# Patient Record
Sex: FEMALE | Race: WHITE | URBAN - METROPOLITAN AREA
[De-identification: names, ages, dates, MRNs, and addresses within clinical notes are randomized per-mention and may not be internally consistent; named-entity substitution may affect disease eponyms.]

---

## 2018-04-10 PROBLEM — Z96.643 HISTORY OF BILATERAL HIP REPLACEMENTS: Status: ACTIVE | Noted: 2018-04-10

## 2018-04-10 PROBLEM — F90.9 ATTENTION DEFICIT HYPERACTIVITY DISORDER (ADHD): Status: ACTIVE | Noted: 2018-04-10

## 2018-04-10 PROBLEM — J45.40 MODERATE PERSISTENT ASTHMA WITHOUT COMPLICATION: Status: ACTIVE | Noted: 2018-04-10

## 2018-04-10 PROBLEM — M47.816 LUMBAR SPONDYLOSIS: Status: ACTIVE | Noted: 2018-04-10

## 2018-04-10 PROBLEM — F90.0 ATTENTION DEFICIT HYPERACTIVITY DISORDER (ADHD), PREDOMINANTLY INATTENTIVE TYPE: Status: RESOLVED | Noted: 2018-04-10 | Resolved: 2018-04-10

## 2018-04-10 PROBLEM — F90.0 ATTENTION DEFICIT HYPERACTIVITY DISORDER (ADHD), PREDOMINANTLY INATTENTIVE TYPE: Status: ACTIVE | Noted: 2018-04-10

## 2021-03-26 ENCOUNTER — IMMUNIZATION (OUTPATIENT)
Dept: PRIMARY CARE CLINIC | Age: 61
End: 2021-03-26
Payer: COMMERCIAL

## 2021-03-26 PROCEDURE — 91301 COVID-19, MODERNA VACCINE 100MCG/0.5ML DOSE: CPT | Performed by: PHYSICIAN ASSISTANT

## 2021-03-26 PROCEDURE — 0011A PR IMM ADMN SARSCOV2 100 MCG/0.5 ML 1ST DOSE: CPT | Performed by: PHYSICIAN ASSISTANT

## 2021-04-23 ENCOUNTER — IMMUNIZATION (OUTPATIENT)
Dept: PRIMARY CARE CLINIC | Age: 61
End: 2021-04-23
Payer: COMMERCIAL

## 2021-04-23 PROCEDURE — 0012A COVID-19, MODERNA VACCINE 100MCG/0.5ML DOSE: CPT | Performed by: INTERNAL MEDICINE

## 2021-04-23 PROCEDURE — 91301 COVID-19, MODERNA VACCINE 100MCG/0.5ML DOSE: CPT | Performed by: INTERNAL MEDICINE

## 2022-12-23 ENCOUNTER — APPOINTMENT (OUTPATIENT)
Dept: GENERAL RADIOLOGY | Age: 62
End: 2022-12-23
Payer: COMMERCIAL

## 2022-12-23 ENCOUNTER — HOSPITAL ENCOUNTER (EMERGENCY)
Age: 62
Discharge: HOME | End: 2022-12-23
Payer: COMMERCIAL

## 2022-12-23 VITALS
SYSTOLIC BLOOD PRESSURE: 156 MMHG | RESPIRATION RATE: 18 BRPM | DIASTOLIC BLOOD PRESSURE: 100 MMHG | HEART RATE: 81 BPM | TEMPERATURE: 97.3 F | OXYGEN SATURATION: 99 %

## 2022-12-23 DIAGNOSIS — S61.216A LACERATION OF RIGHT LITTLE FINGER WITHOUT FOREIGN BODY WITHOUT DAMAGE TO NAIL, INITIAL ENCOUNTER: Primary | ICD-10-CM

## 2022-12-23 DIAGNOSIS — S60.051A CONTUSION OF RIGHT LITTLE FINGER WITHOUT DAMAGE TO NAIL, INITIAL ENCOUNTER: ICD-10-CM

## 2022-12-23 DIAGNOSIS — Z23 NEED FOR TETANUS BOOSTER: ICD-10-CM

## 2022-12-23 PROCEDURE — 6370000000 HC RX 637 (ALT 250 FOR IP): Performed by: NURSE PRACTITIONER

## 2022-12-23 PROCEDURE — 2500000003 HC RX 250 WO HCPCS: Performed by: NURSE PRACTITIONER

## 2022-12-23 PROCEDURE — 12002 RPR S/N/AX/GEN/TRNK2.6-7.5CM: CPT

## 2022-12-23 PROCEDURE — 6360000002 HC RX W HCPCS: Performed by: NURSE PRACTITIONER

## 2022-12-23 PROCEDURE — 90471 IMMUNIZATION ADMIN: CPT | Performed by: NURSE PRACTITIONER

## 2022-12-23 PROCEDURE — 73140 X-RAY EXAM OF FINGER(S): CPT

## 2022-12-23 PROCEDURE — 99284 EMERGENCY DEPT VISIT MOD MDM: CPT

## 2022-12-23 PROCEDURE — 90715 TDAP VACCINE 7 YRS/> IM: CPT | Performed by: NURSE PRACTITIONER

## 2022-12-23 RX ORDER — TETANUS AND DIPHTHERIA TOXOIDS ADSORBED 2; 2 [LF]/.5ML; [LF]/.5ML
0.5 INJECTION INTRAMUSCULAR ONCE
Status: DISCONTINUED | OUTPATIENT
Start: 2022-12-23 | End: 2022-12-23

## 2022-12-23 RX ORDER — CEPHALEXIN 500 MG/1
500 CAPSULE ORAL 4 TIMES DAILY
Qty: 28 CAPSULE | Refills: 0 | Status: SHIPPED | OUTPATIENT
Start: 2022-12-23 | End: 2022-12-30

## 2022-12-23 RX ORDER — LIDOCAINE HYDROCHLORIDE 10 MG/ML
20 INJECTION, SOLUTION INFILTRATION; PERINEURAL ONCE
Status: COMPLETED | OUTPATIENT
Start: 2022-12-23 | End: 2022-12-23

## 2022-12-23 RX ORDER — NAPROXEN 500 MG/1
500 TABLET ORAL 2 TIMES DAILY PRN
Qty: 14 TABLET | Refills: 0 | Status: SHIPPED | OUTPATIENT
Start: 2022-12-23 | End: 2022-12-30

## 2022-12-23 RX ORDER — IBUPROFEN 800 MG/1
800 TABLET ORAL ONCE
Status: COMPLETED | OUTPATIENT
Start: 2022-12-23 | End: 2022-12-23

## 2022-12-23 RX ORDER — BACITRACIN ZINC 500 [USP'U]/G
OINTMENT TOPICAL ONCE
Status: COMPLETED | OUTPATIENT
Start: 2022-12-23 | End: 2022-12-23

## 2022-12-23 RX ADMIN — BACITRACIN ZINC: 500 OINTMENT TOPICAL at 18:24

## 2022-12-23 RX ADMIN — LIDOCAINE HYDROCHLORIDE 20 ML: 10 INJECTION, SOLUTION INFILTRATION; PERINEURAL at 18:23

## 2022-12-23 RX ADMIN — TETANUS TOXOID, REDUCED DIPHTHERIA TOXOID AND ACELLULAR PERTUSSIS VACCINE, ADSORBED 0.5 ML: 5; 2.5; 8; 8; 2.5 SUSPENSION INTRAMUSCULAR at 18:19

## 2022-12-23 RX ADMIN — IBUPROFEN 800 MG: 800 TABLET, FILM COATED ORAL at 17:15

## 2022-12-23 NOTE — ED PROVIDER NOTES
[facility]  Department of Emergency Medicine   ED  Encounter Note  Admit Date/RoomTime: 2022  5:10 PM  ED Room: [facility]  NAME: Juarez Osman  : 1960  MRN: [id number]    CHIEF COMPLAINT     Laceration (Patient got her right pinky finger caught in a door hinge that caused 2 lacerations on the finger. )    HISTORY OF PRESENT ILLNESS        Juarez Osman is a 63 y.o. female who presents to the ED by private vehicle for laceration to right 5th finger, beginning few minutes ago. She reports she caught her finger in a old wooden door hinge. She is right hand dominant. The complaint has been persistent and gradually worsening and are moderate in severity. She has subjective numbness to the finger. REVIEW OF SYSTEMS     Pertinent positives and negatives are stated within HPI, all other systems reviewed and are negative. Past Medical History:  has a past medical history of Asthma and Trauma. Surgical History:  has a past surgical history that includes Tubal ligation; pelvic laparoscopy;  section ();  section (); Tonsillectomy (); and Hysterectomy (10/2010). Social History:  reports that she has never smoked. She has never used smokeless tobacco. She reports current alcohol use. She reports that she does not use drugs. Family History: family history includes Heart Disease in her mother; Mental Illness in her father; Obesity in her mother.      Allergies: Doxycycline, Eggs or egg-derived products, and Sulfa antibiotics  CURRENT MEDICATIONS       Discharge Medication List as of 2022  6:53 PM        CONTINUE these medications which have NOT CHANGED    Details   conjugated estrogens (PREMARIN) 0.625 MG/GM vaginal cream Place 0.5g vaginal twice weekly, Disp-1 Tube, R-0, Normal      estradiol (ESTRACE VAGINAL) 0.1 MG/GM vaginal cream Place 0.5 g vaginally Twice a Week, Disp-1 Tube, R-3Normal minocycline (MINOCIN;DYNACIN) 50 MG capsule Take 1 capsule by mouth 2 times daily, Disp-180 capsule, R-3Normal      DiphenhydrAMINE HCl (BENADRYL ALLERGY PO) Take by mouthHistorical Med      doxazosin (CARDURA) 1 MG tablet TAKE 3 TABLETS BY MOUTH AT BEDTIME, R-0Historical Med      aspirin 81 MG tablet Take 81 mg by mouth dailyHistorical Med      Probiotic Product (SOLUBLE FIBER/PROBIOTICS PO) Take by mouthHistorical Med      Multiple Vitamins-Minerals (EMERGEN-C IMMUNE PO) Take by mouthHistorical Med      Amphetamine-Dextroamphetamine (ADDERALL PO) Take by mouth as needed Historical Med      cetirizine (ZYRTEC) 10 MG tablet Take 10 mg by mouth as needed Historical Med      montelukast (SINGULAIR) 10 MG tablet Take 10 mg by mouth nightly. lorazepam (ATIVAN) 0.5 MG tablet Take 0.5 mg by mouth every 6 hours as needed . Jill Brewer Historical Med             SCREENINGS     Juanito Coma Scale  Eye Opening: Spontaneous  Best Verbal Response: Oriented  Best Motor Response: Obeys commands  Juanito Coma Scale Score: 15         CIWA Assessment  BP: (!) 156/100  Heart Rate: 81       PHYSICAL EXAM   Oxygen Saturation Interpretation: Normal on room air analysis. ED Triage Vitals   BP Temp Temp src Heart Rate Resp SpO2 Height Weight   12/23/22 1707 12/23/22 1703 -- 12/23/22 1703 12/23/22 1707 12/23/22 1703 -- --   (!) 156/100 97.3 °F (36.3 °C)  81 18 99 %           Physical Exam  Constitutional/General: Alert and oriented x3, well appearing, non toxic  HEENT:  NC/NT. PERRLA,  Airway patent. Neck: Supple, full ROM, non tender to palpation in the midline, no stridor, no crepitus, no meningeal signs  Respiratory: Lungs clear to auscultation bilaterally, no wheezes, rales, or rhonchi. Not in respiratory distress  CV:  Regular rate. Regular rhythm. No murmurs, gallops, or rubs. 2+ distal pulses  Chest: No chest wall tenderness  GI:  Abdomen Soft, Non tender, Non distended. +BS. No rebound, guarding, or rigidity.  No pulsatile masses. Musculoskeletal: Moves all extremities x 4. Warm and well perfused, no clubbing, cyanosis, or edema. Capillary refill <3 seconds. Laceration to the right distal finger approximately 1.5 cm on the dorsum and 1.5 cm on the volar aspect  Integument: skin warm and dry. No rashes. Lymphatic: no lymphadenopathy noted  Neurologic: GCS 15, no focal deficits, symmetric strength 5/5 in the upper and lower extremities bilaterally  Psychiatric: Normal Affect    DIAGNOSTIC RESULTS   (All laboratory and radiology results have been personally reviewed by myself)  Labs:  No results found for this visit on 12/23/22. Imaging: All Radiology results interpreted by Radiologist unless otherwise noted. XR FINGER RIGHT (MIN 2 VIEWS)   Final Result   No active process. ED COURSE   Vitals:    Vitals:    12/23/22 1703 12/23/22 1707   BP:  (!) 156/100   Pulse: 81    Resp:  18   Temp: 97.3 °F (36.3 °C)    SpO2: 99%        Patient was given the following medications:  Medications   lidocaine 1 % injection 20 mL (20 mLs IntraDERmal Given by Other 12/23/22 1823)   bacitracin zinc ointment ( Topical Given by Other 12/23/22 1824)   ibuprofen (ADVIL;MOTRIN) tablet 800 mg (800 mg Oral Given 12/23/22 1715)   tetanus-diphth-acell pertussis (BOOSTRIX) injection 0.5 mL (0.5 mLs IntraMUSCular Given 12/23/22 1819)          PROCEDURES     PROCEDURE NOTE  12/23/22       Time: 1830    LACERATION REPAIR  Risks, benefits and alternatives (for applicable procedures below) described. Performed By: ADEEL Reyes CNP. Informed consent: Verbal consent obtained. The patient was counseled regarding the procedure in person, it's indications, risks, potential complications and alternatives and any questions were answered. Verbal consent was obtained. Laceration #: 1. Location: right distal finger dorsum  Length: 1.5 cm. The wound area was irrigated with sterile saline and draped in a sterile fashion.   Local Anesthesia: obtained with Lidocaine 1% without epinephrine. The wound was explored with the following results: Thickness: full thickness. no foreign body or tendon injury seen. Debridement: None. Undermining: None. Wound Margins Revised: None. Flaps Aligned: yes. The wound was closed in single layer closure with #3  4-0 Prolene using interrupted suture(s). Dressing:  bacitracin, a sterile dressing, and a bandage. PROCEDURE NOTE  12/23/22       Time: 1838    LACERATION REPAIR  Risks, benefits and alternatives (for applicable procedures below) described. Performed By: ADEEL Martin CNP. Informed consent: Verbal consent obtained. The patient was counseled regarding the procedure in person, it's indications, risks, potential complications and alternatives and any questions were answered. Verbal consent was obtained. Laceration #: 2. Location: right distal finger volar aspect  Length: 1.5 cm. The wound area was irrigated with sterile saline and draped in a sterile fashion. Local Anesthesia:  obtained with Lidocaine 1% without epinephrine. The wound was explored with the following results: Thickness: full thickness. No tendon laceration seen, no foreign body or tendon injury seen. Debridement: None. Undermining: None. Wound Margins Revised: no.  Flaps Aligned: yes. The wound was closed in single layer closure with #3  4-0 Prolene using interrupted suture(s). Dressing:  bacitracin and a sterile dressing. There were no additional wounds requiring formal closure. Patient tolerated procedure well.          CONSULTS:  None  DIFFERENTIAL DX_MDM   MDM:  This is a 53-year-old female patient who arrives today with a laceration to the right fifth finger on the dorsum and volar aspect and caught her finger in the hinge of a door several minutes prior to arrival and has subjective numbness and does have full sensation to touch when palpated she is able to flex and extend at the MCP PIP and DIP joint x-ray was performed and does have no acute fractures. Tetanus booster was updated today. She is right-hand dominant. Compartments soft and compressible in the hand with brisk capillary refill and sensation intact to medial, radial and ulnar nerves. Patient had laceration repaired to the dorsum and volar aspect after it was thoroughly irrigated and no tendon involvement. Patient was given Motrin and she additionally will be given Keflex since it was located over the actual distal joint prophylactically. She was advised on signs and symptoms of infection warranting immediate return such as fever, chills, redness, purulent drainage, pain out of proportion or streaking up the arm to return immediately for reevaluation. Patient advised to follow-up with her PCP in the next few days for reevaluation and have suture removal in 7 to 10 days. Patient also advised to keep her hand elevated above the level of the heart is much as possible. Plan of Care/Counseling:  ADEEL Dumas CNP reviewed today's visit with the patient and spouse / life partner in addition to providing specific details for the plan of care and counseling regarding the diagnosis and prognosis. Questions are answered at this time and are agreeable with the plan. ASSESSMENT     1. Laceration of right little finger without foreign body without damage to nail, initial encounter    2. Contusion of right little finger without damage to nail, initial encounter    3. Need for tetanus booster        DISPOSITION   Discharged home.   Patient condition is good    NEW MEDICATIONS     Discharge Medication List as of 12/23/2022  6:53 PM        START taking these medications    Details   naproxen (NAPROSYN) 500 MG tablet Take 1 tablet by mouth 2 times daily as needed for Pain (take with food and full glass of water.), Disp-14 tablet, R-0Normal      cephALEXin (KEFLEX) 500 MG capsule Take 1 capsule by mouth 4 times daily for 7 days, Disp-28 capsule, R-0Normal           Electronically signed by Cydney Kanner, APRN - CNP   DD: 12/23/22  **This report was transcribed using voice recognition software. Every effort was made to ensure accuracy; however, inadvertent computerized transcription errors may be present.   END OF ED PROVIDER NOTE      Cydney Kanner, APRN - CNP  12/23/22 2007

## 2022-12-31 ENCOUNTER — HOSPITAL ENCOUNTER (EMERGENCY)
Age: 62
Discharge: HOME | End: 2022-12-31
Payer: COMMERCIAL

## 2022-12-31 VITALS
BODY MASS INDEX: 25.85 KG/M2 | HEART RATE: 70 BPM | SYSTOLIC BLOOD PRESSURE: 124 MMHG | TEMPERATURE: 97.3 F | WEIGHT: 170 LBS | DIASTOLIC BLOOD PRESSURE: 72 MMHG | RESPIRATION RATE: 16 BRPM | OXYGEN SATURATION: 100 %

## 2022-12-31 DIAGNOSIS — Z48.02 VISIT FOR SUTURE REMOVAL: Primary | ICD-10-CM

## 2022-12-31 PROCEDURE — 99211 OFF/OP EST MAY X REQ PHY/QHP: CPT

## 2022-12-31 RX ORDER — ESTRADIOL 1 MG/1
1 TABLET ORAL DAILY
COMMUNITY

## 2022-12-31 NOTE — ED PROVIDER NOTES
[facility]  EMERGENCY DEPARTMENT ENCOUNTER        NAME: Klaudia White  :  1960  MRN:  [id number]  Date of evaluation: 2022  Provider: Jessica Murrieta PA-C  PCP: Raúl Dunaway DO  Note Started : 2:36 PM EST 22    Chief Complaint: Suture / Staple Removal (Right 5th finger suture removal)      This is a 59-year-old female the presents to urgent care for suture removal.  She has had no problems. She has 2 lacerations sites to the right fifth finger. There are 6 sutures in total. Sutures placed on 22. She is had no problems currently. On first contact patient she appears to be no acute distress. Review of Systems  Pertinent positives and negatives are stated within HPI, all other systems reviewed and are negative. Allergies: Doxycycline, Eggs or egg-derived products, and Sulfa antibiotics     --------------------------------------------- PAST HISTORY ---------------------------------------------  Past Medical History:  has a past medical history of Asthma and Trauma. Past Surgical History:  has a past surgical history that includes Tubal ligation; pelvic laparoscopy;  section ();  section (); Tonsillectomy (); and Hysterectomy (10/2010). Social History:  reports that she has never smoked. She has never used smokeless tobacco. She reports current alcohol use. She reports that she does not use drugs. Family History: family history includes Heart Disease in her mother; Mental Illness in her father; Obesity in her mother. The patients home medications have been reviewed. The nursing notes within the ED encounter have been reviewed.      ------------------------------------------------SCREENINGS----------------------------------------------                        LAURIEWA Assessment  BP: 124/72  Heart Rate: 70           ---------------------------------------------PHYSICAL EXAM --------------------------------------------    Vitals:    12/31/22 1311   BP: 124/72   Pulse: 70   Resp: 16   Temp: 97.3 °F (36.3 °C)   SpO2: 100%   Weight: 170 lb (77.1 kg)     Oxygen Saturation Interpretation: Normal     Physical Exam  Vitals and nursing note reviewed. Constitutional:       Appearance: She is well-developed. HENT:      Head: Normocephalic and atraumatic. Eyes:      Pupils: Pupils are equal, round, and reactive to light. Cardiovascular:      Rate and Rhythm: Normal rate and regular rhythm. Heart sounds: Normal heart sounds. No murmur heard. Pulmonary:      Effort: Pulmonary effort is normal. No respiratory distress. Breath sounds: Normal breath sounds. No wheezing or rales. Abdominal:      General: Bowel sounds are normal.      Palpations: Abdomen is soft. Tenderness: There is no abdominal tenderness. There is no guarding or rebound. Musculoskeletal:      Cervical back: Normal range of motion and neck supple. Skin:     General: Skin is warm and dry. Comments: Healing wounds to right fifth finger. No redness no drainage. Full range of motion no weakness. No cyanosis. Neurological:      General: No focal deficit present. Mental Status: She is alert and oriented to person, place, and time. Cranial Nerves: No cranial nerve deficit. Coordination: Coordination normal.       -------------------------------------------------- RESULTS -------------------------------------------------  No results found for this visit on 12/31/22. No orders to display       Labs Reviewed - No data to display    When ordered only abnormal lab results are displayed. All other labs were within normal range or not returned as of this dictation. Interpretation per the Radiologist below, if available at the time of this note:    No orders to display     No results found. No results found. -------------------------------------------------PROCEDURES--------------------------------------------  Unless otherwise noted below, none      Suture Removal    Date/Time: 12/31/2022 2:30 PM  Performed by: Yimi Engel PA-C  Authorized by: Yimi Engel PA-C     Consent:     Consent obtained:  Verbal    Consent given by:  Patient    Risks, benefits, and alternatives were discussed: yes      Risks discussed:  Bleeding    Alternatives discussed:  No treatment  Universal protocol:     Procedure explained and questions answered to patient or proxy's satisfaction: yes      Patient identity confirmed:  Verbally with patient  Location:     Location: right fifth finger. Procedure details:     Wound appearance:  No signs of infection, good wound healing and clean    Number of sutures removed:  6  Post-procedure details:     Post-removal:  Dressing applied    Procedure completion:  Tolerated well, no immediate complications      ---EMERGENCY DEPARTMENT COURSE and DIFFERENTIAL DIAGNOSIS/MDM---  (CC/HPI Summary, DDx, ED Course, and Reassessment:) (Disposition Considerations (include 1 Tests not done, Admit vs D/C, Shared Decision Making, Pt Expectation of Test or Tx., Consults, Social Determinants, Chronic Conditiions, Records reviewed)    MDM       DISCHARGE MEDICATIONS:  New Prescriptions    No medications on file       DISCONTINUED MEDICATIONS:  Discontinued Medications    AMPHETAMINE-DEXTROAMPHETAMINE (ADDERALL PO)    Take by mouth as needed     ASPIRIN 81 MG TABLET    Take 81 mg by mouth daily    CETIRIZINE (ZYRTEC) 10 MG TABLET    Take 10 mg by mouth as needed     DOXAZOSIN (CARDURA) 1 MG TABLET    TAKE 3 TABLETS BY MOUTH AT BEDTIME    MONTELUKAST (SINGULAIR) 10 MG TABLET    Take 10 mg by mouth nightly.        PATIENT REFERRED TO:  Greta Magdaleno, [address] [place] [address], [address] [place] [address],[address]  [facility] [address]  [phone]    Schedule an appointment as soon as possible for a visit --------------------------------- ADDITIONAL PROVIDER NOTES ---------------------------------  I have spoken with the patient and discussed todays results, in addition to providing specific details for the plan of care and counseling regarding the diagnosis and prognosis. Their questions are answered at this time and they are agreeable with the plan. This patient is stable for discharge. I have shared the specific conditions for return, as well as the importance of follow-up. * NOTE: (Please note that portions of this note were completed with a voice recognition program.  Efforts were made to edit the dictations but occasionally words are mis-transcribed. )    --------------------------------- IMPRESSION AND DISPOSITION ---------------------------------    IMPRESSION  1. Visit for suture removal        DISPOSITION Decision To Discharge 12/31/2022 02:35:15 PM    Disposition: Discharge to home  Patient condition is good    I am the Primary Clinician of Record.      Juan Carlos Ortiz PA-C (electronically signed) on 12/31/2022 at 2:36 PM        Juan Carlos Ortiz PA-C  12/31/22 3009

## 2023-02-12 ENCOUNTER — HOSPITAL ENCOUNTER (EMERGENCY)
Age: 63
Discharge: HOME | End: 2023-02-12
Attending: EMERGENCY MEDICINE
Payer: COMMERCIAL

## 2023-02-12 ENCOUNTER — APPOINTMENT (OUTPATIENT)
Dept: GENERAL RADIOLOGY | Age: 63
End: 2023-02-12
Payer: COMMERCIAL

## 2023-02-12 ENCOUNTER — APPOINTMENT (OUTPATIENT)
Dept: CT IMAGING | Age: 63
End: 2023-02-12
Payer: COMMERCIAL

## 2023-02-12 VITALS
HEART RATE: 60 BPM | SYSTOLIC BLOOD PRESSURE: 128 MMHG | DIASTOLIC BLOOD PRESSURE: 63 MMHG | BODY MASS INDEX: 25.85 KG/M2 | TEMPERATURE: 98.2 F | OXYGEN SATURATION: 98 % | WEIGHT: 170 LBS | RESPIRATION RATE: 16 BRPM
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
## 2023-02-12 DIAGNOSIS — S09.90XA INJURY OF HEAD, INITIAL ENCOUNTER: Primary | ICD-10-CM

## 2023-02-12 PROCEDURE — 99284 EMERGENCY DEPT VISIT MOD MDM: CPT

## 2023-02-12 PROCEDURE — 70486 CT MAXILLOFACIAL W/O DYE: CPT

## 2023-02-12 PROCEDURE — 73110 X-RAY EXAM OF WRIST: CPT

## 2023-02-12 PROCEDURE — 70450 CT HEAD/BRAIN W/O DYE: CPT

## 2023-02-12 PROCEDURE — 6370000000 HC RX 637 (ALT 250 FOR IP): Performed by: EMERGENCY MEDICINE

## 2023-02-12 PROCEDURE — 72100 X-RAY EXAM L-S SPINE 2/3 VWS: CPT

## 2023-02-12 PROCEDURE — 72125 CT NECK SPINE W/O DYE: CPT

## 2023-02-12 PROCEDURE — 73130 X-RAY EXAM OF HAND: CPT

## 2023-02-12 RX ORDER — DIPHENHYDRAMINE HCL 25 MG
25 TABLET ORAL ONCE
Status: COMPLETED | OUTPATIENT
Start: 2023-02-12 | End: 2023-02-12

## 2023-02-12 RX ORDER — OXYCODONE HYDROCHLORIDE AND ACETAMINOPHEN 5; 325 MG/1; MG/1
1 TABLET ORAL ONCE
Status: COMPLETED | OUTPATIENT
Start: 2023-02-12 | End: 2023-02-12

## 2023-02-12 RX ADMIN — DIPHENHYDRAMINE HCL 25 MG: 25 TABLET ORAL at 04:14

## 2023-02-12 RX ADMIN — OXYCODONE AND ACETAMINOPHEN 1 TABLET: 5; 325 TABLET ORAL at 05:42

## 2023-02-12 ASSESSMENT — PAIN DESCRIPTION - ORIENTATION: ORIENTATION: RIGHT

## 2023-02-12 ASSESSMENT — PAIN DESCRIPTION - DESCRIPTORS: DESCRIPTORS: ACHING

## 2023-02-12 ASSESSMENT — PAIN SCALES - GENERAL: PAINLEVEL_OUTOF10: 8

## 2023-02-12 ASSESSMENT — LIFESTYLE VARIABLES
HOW OFTEN DO YOU HAVE A DRINK CONTAINING ALCOHOL: NEVER
HOW MANY STANDARD DRINKS CONTAINING ALCOHOL DO YOU HAVE ON A TYPICAL DAY: PATIENT DOES NOT DRINK

## 2023-02-12 ASSESSMENT — PAIN DESCRIPTION - LOCATION: LOCATION: HEAD

## 2023-02-12 NOTE — ED PROVIDER NOTES
Douglas Palacios is a 63 y.o. female    HPI   Douglas Palacios is a 63 y.o. female presenting to the ED for Fall (Tripped and fell, right arm, shoulder, wrist, hip and head pain. Right eye swollen. Denies loc denies blood thinner )    History comes primarily from the patient. Patient presents for mechanical fall in which she landed on her right side. She did hit her head and face but denies loss of consciousness or blood thinner use. The patient is complaining of primarily right face pain, and right hand pain. She does have some chronic lumbar back pain. She also has some very minor soreness of her right knee, shoulder, hip, but these are minimal.  The pain in her hand is moderate to severe, the pain in her face is moderate to severe. The patient did have 2 cocktails at dinner last night, approximately 8 hours ago. She does occasionally use marijuana, but does not smoke otherwise. Review of Systems   Full review of systems completed. Pertinent positives and negatives per the HPI, unless otherwise stated ROS is negative. Physical Exam  Vitals and nursing note reviewed. Constitutional:       General: She is not in acute distress. Appearance: Normal appearance. She is not ill-appearing. HENT:      Head: Normocephalic. Comments: Significant bruising to the right side of the face. Right Ear: External ear normal.      Left Ear: External ear normal.      Nose: Nose normal. No rhinorrhea. Mouth/Throat:      Mouth: Mucous membranes are moist.      Pharynx: Oropharynx is clear. Eyes:      Extraocular Movements: Extraocular movements intact. Conjunctiva/sclera: Conjunctivae normal.      Pupils: Pupils are equal, round, and reactive to light. Comments: EOM intact, no conjunctival hemorrhage. Pupils are equal and reactive. Visual fields intact. Cardiovascular:      Rate and Rhythm: Normal rate and regular rhythm. Pulses: Normal pulses.       Heart sounds: Normal heart sounds. No murmur heard. Pulmonary:      Effort: Pulmonary effort is normal. No respiratory distress. Abdominal:      General: There is no distension. Palpations: Abdomen is soft. Musculoskeletal:         General: No swelling or deformity. Normal range of motion. Cervical back: Normal range of motion and neck supple. No rigidity or tenderness. Comments: Mild right-sided lumbar paraspinous muscular tenderness. No midline tenderness   Skin:     General: Skin is warm and dry. Coloration: Skin is not jaundiced or pale. Findings: Bruising present. Comments: Bruising to the right hand along with pain on palpation   Neurological:      General: No focal deficit present. Mental Status: She is alert and oriented to person, place, and time. Mental status is at baseline. Cranial Nerves: No cranial nerve deficit. Sensory: No sensory deficit. Motor: No weakness. Psychiatric:         Mood and Affect: Mood normal.         Behavior: Behavior normal.          Medical Decision Making/Differential Diagnosis:    CC/HPI Summary, social determinants of health, records reviewed, DDX, testing done/not done, ED course, reassessment, disposition considerations/shared decision making with patient, consults, disposition:    Medical Decision Making  Amount and/or Complexity of Data Reviewed  Radiology: ordered and independent interpretation performed. Decision-making details documented in ED Course. Risk  OTC drugs. Prescription drug management. Patient presented for fall with pain to her right hand and right head with significant bruising around her right face and a little bit of her right hand. These are the 2 primary complaints with other minor lesser complaints of pain on her right side of the body. X-rays of the right hand and wrist and lumbar spine were ordered. CT of the head, cervical spine and facial bones were ordered.   Right wrist and right hand showed no osseous abnormalities. X-ray of the lumbar spine appears to have no osseous abnormalities either. CT of the head, cervical spine and facial bones showed no osseous abnormalities, or intracranial hemorrhaging. Patient received a dose of Benadryl here in the emergency department because she says she breaks out in hives very easily in medical settings for unknown allergy. She is given a dose of Percocet prior to discharge for her pain but none are prescribed outpatient. The patient declined a prescription for ibuprofen or naproxen saying they make her sick. Patient will follow-up outpatient with her primary care physician. Past medical history/chonic conditions affecting care   has a past medical history of Asthma and Trauma. Social History     Tobacco Use    Smoking status: Never    Smokeless tobacco: Never   Substance Use Topics    Alcohol use: Yes     Comment: social    Drug use: No         RADIOLOGY:   Non-plain film images such as CT, Ultrasound and MRI are read by the radiologist. Danie Loosen radiographic images are visualized and preliminarily interpreted by myself with the below findings:  Patient's x-rays were independently interpreted by me including the x-ray of the right hand, wrist and the lumbar spine. There are no osseous abnormalities noted. As interpreted by me, the below displayed labs are abnormal. All other labs obtained during this visit were within normal range or not returned as of this dictation. Labs Reviewed - No data to display    Interpretation per the Radiologist below, if available at the time of this note:  XR HAND RIGHT (MIN 3 VIEWS)   Final Result   1. Intact hand and wrist without acute osseous fracture or dislocation. XR WRIST RIGHT (MIN 3 VIEWS)   Final Result   1. Intact hand and wrist without acute osseous fracture or dislocation. CT HEAD WO CONTRAST   Final Result   1. No acute intracranial abnormality is identified.    2. Right preseptal and periorbital soft tissue swelling with extension along   the right facial soft tissues and right frontal scalp. No underlying   calvarial fracture or facial bone fracture. 3. Intact facial bones. 4. Intact cervical spine without acute fracture. 5. Reversal the normal cervical lordosis which may be related to muscular   strain. CT CSpine W/O Contrast   Final Result   1. No acute intracranial abnormality is identified. 2. Right preseptal and periorbital soft tissue swelling with extension along   the right facial soft tissues and right frontal scalp. No underlying   calvarial fracture or facial bone fracture. 3. Intact facial bones. 4. Intact cervical spine without acute fracture. 5. Reversal the normal cervical lordosis which may be related to muscular   strain. CT FACIAL BONES WO CONTRAST   Final Result   1. No acute intracranial abnormality is identified. 2. Right preseptal and periorbital soft tissue swelling with extension along   the right facial soft tissues and right frontal scalp. No underlying   calvarial fracture or facial bone fracture. 3. Intact facial bones. 4. Intact cervical spine without acute fracture. 5. Reversal the normal cervical lordosis which may be related to muscular   strain. XR LUMBAR SPINE (2-3 VIEWS)    (Results Pending)     No results found. No results found.     Procedures       Critical care time      Emergency Department Course  Vitals:    Vitals:    02/12/23 0359 02/12/23 0408 02/12/23 0543   BP:  (!) 142/70 128/63   Pulse: 74 74 60   Resp:  18 16   Temp:  98.2 °F (36.8 °C)    SpO2:  98% 98%   Weight:  170 lb (77.1 kg)        Patient was given the following medications:  Medications   diphenhydrAMINE (BENADRYL) tablet 25 mg (25 mg Oral Given 2/12/23 0414)   oxyCODONE-acetaminophen (PERCOCET) 5-325 MG per tablet 1 tablet (1 tablet Oral Given 2/12/23 9176)               CONSULTS: (Who and What was discussed)  None        I am the Primary Clinician of Record. Final impression  1. Injury of head, initial encounter    2. Fall, initial encounter          Disposition/plan  DISPOSITION Decision To Discharge 02/12/2023 05:33:00 AM    PATIENT REFERRED TO:  Inna Snowden, [address] [facility], [address] [place] [address],[address]  [facility] [id number]    Call in 1 day      DISCHARGE MEDICATIONS:  Discharge Medication List as of 2/12/2023  5:48 AM          DISCONTINUED MEDICATIONS:  Discharge Medication List as of 2/12/2023  5:48 AM                 (Please note that portions of this note were completed with a voice recognition program.  Efforts were made to edit the dictations but occasionally words are mis-transcribed.)         Melyssa Siegel MD  Resident  02/12/23 1173  ATTENDING PROVIDER ATTESTATION:     I have personally performed and/or participated in the history, exam, medical decision making, and procedures and agree with all pertinent clinical information. I have also reviewed and agree with the past medical, family and social history unless otherwise noted. I have discussed this patient in detail with the resident, and provided the instruction and education regarding fall. My findings/Plan: I was the primary provider for patient. With history of asthma presenting here because of mechanical fall. She reports that she was walking in the street. Patient was out with her . Patient reports there was some kind of object that was on the street and she tripped and fell she did hit her head. Patient did not lose consciousness she does complain of head pain as well as pain around her eye patient also complaining of some right wrist pain as well as some chest wall pain. Patient reporting no difficulty breathing she reports no hip pain she reports some mild lower back pain. Patient reporting some mild numbness to her right hand.   Patient is awake alert oriented x3 patient does have noted edema and swelling to her right side of report as well as periorbital ecchymosis. Patient pupils are equal and reactive to gross visual acuity is within normal limits. Patient heart lung exam normal she has some very mild tenderness to her right lateral chest wall. There is no crepitus lung sounds are equal bilaterally. Patient does have noted contusion bruising to her hand and tenderness to her wrist.  Range of motion full but painful. Patient pulses are intact and abdomen soft nontender and there is some mild tenderness to lower lumbar spine. Patient has normal movement of her lower extremities. Pulses are intact distally. Patient differential includes subarachnoid hemorrhage as well as orbital fracture as well as facial fracture and wrist fracture. Patient last evaluation was in December for wound care. Patient does smoke occasionally socially drinks. Patient did undergo imaging of her head as well as cervical spine and facial bones there is no evidence of any facial fracture or intracranial hemorrhage or cervical spine fracture. Patient did undergo x-rays of her hand and wrist I did look at the x-rays myself I did not see any obvious fractures radiology confirmed no fracture as well. Patient did get a dose of Percocet here in the emergency department for her pain. Patient was offered Naprosyn or anti-inflammatory patient declined prescription. Patient lumbar spine x-rays did not show any signs of any fracture. Patient is able to ambulate without any difficulty. Patient vital signs are stable here in the emergency room. Patient will be discharged. Patient to follow-up with her PCP while reevaluate patient.   Patient reports at times she does have sensation of kaleidoscope sensation in her vision in the right eye I did offer to have her referred to ophthalmology as patient reports that she will follow-up with her eye doctor and I did tell her that she would need to see this person sooner rather than later her gross visual acuity here in the emergency department was normal for me. Patient understands this and will follow-up outpatient.        Tashi Garcia MD  02/12/23 7090       Tashi Garcia MD  02/12/23 Macario Khan MD  02/12/23 9717

## 2025-03-12 DIAGNOSIS — M25.512 ACUTE PAIN OF LEFT SHOULDER: Primary | ICD-10-CM

## 2025-08-21 ENCOUNTER — HOSPITAL ENCOUNTER (OUTPATIENT)
Dept: LAB | Age: 65
Discharge: HOME | End: 2025-08-21
Payer: COMMERCIAL

## 2025-08-21 LAB
25(OH)D3 SERPL-MCNC: 28.5 NG/ML (ref 30–100)
ALBUMIN SERPL-MCNC: 4.7 G/DL (ref 3.5–5.2)
ALP SERPL-CCNC: 63 U/L (ref 35–104)
ALT SERPL-CCNC: 16 U/L (ref 0–35)
ANION GAP SERPL CALCULATED.3IONS-SCNC: 12 MMOL/L (ref 7–16)
AST SERPL-CCNC: 29 U/L (ref 0–35)
BILIRUB SERPL-MCNC: 0.6 MG/DL (ref 0–1.2)
BUN SERPL-MCNC: 17 MG/DL (ref 8–23)
CALCIUM SERPL-MCNC: 9.4 MG/DL (ref 8.8–10.2)
CHLORIDE SERPL-SCNC: 103 MMOL/L (ref 98–107)
CHOLEST SERPL-MCNC: 186 MG/DL
CO2 SERPL-SCNC: 21 MMOL/L (ref 22–29)
CREAT SERPL-MCNC: 0.9 MG/DL (ref 0.5–1)
CRP SERPL HS-MCNC: <3 MG/L (ref 0–5)
ERYTHROCYTE [SEDIMENTATION RATE] IN BLOOD BY WESTERGREN METHOD: 5 MM/HR (ref 0–20)
FERRITIN SERPL-MCNC: 125 NG/ML
GFR, ESTIMATED: 72 ML/MIN/1.73M2
GLUCOSE SERPL-MCNC: 82 MG/DL (ref 74–99)
HDLC SERPL-MCNC: 79 MG/DL
IRON SATN MFR SERPL: 36 % (ref 15–50)
IRON SERPL-MCNC: 100 UG/DL (ref 37–145)
LDLC SERPL CALC-MCNC: 98 MG/DL
POTASSIUM SERPL-SCNC: 3.7 MMOL/L (ref 3.5–5.1)
PROT SERPL-MCNC: 7.1 G/DL (ref 6.4–8.3)
SEND OUT REPORT: NORMAL
SODIUM SERPL-SCNC: 136 MMOL/L (ref 136–145)
TEST NAME: NORMAL
TIBC SERPL-MCNC: 278 UG/DL (ref 250–450)
TRIGL SERPL-MCNC: 46 MG/DL
TSH SERPL DL<=0.05 MIU/L-ACNC: 0.92 UIU/ML (ref 0.27–4.2)
VIT B12 SERPL-MCNC: 801 PG/ML (ref 232–1245)
VLDLC SERPL CALC-MCNC: 9 MG/DL

## 2025-08-21 PROCEDURE — 83550 IRON BINDING TEST: CPT

## 2025-08-21 PROCEDURE — 86140 C-REACTIVE PROTEIN: CPT

## 2025-08-21 PROCEDURE — 82172 ASSAY OF APOLIPOPROTEIN: CPT

## 2025-08-21 PROCEDURE — 36415 COLL VENOUS BLD VENIPUNCTURE: CPT

## 2025-08-21 PROCEDURE — 84443 ASSAY THYROID STIM HORMONE: CPT

## 2025-08-21 PROCEDURE — 83088 ASSAY OF HISTAMINE: CPT

## 2025-08-21 PROCEDURE — 85652 RBC SED RATE AUTOMATED: CPT

## 2025-08-21 PROCEDURE — 83540 ASSAY OF IRON: CPT

## 2025-08-21 PROCEDURE — 80061 LIPID PANEL: CPT

## 2025-08-21 PROCEDURE — 80053 COMPREHEN METABOLIC PANEL: CPT

## 2025-08-21 PROCEDURE — 82306 VITAMIN D 25 HYDROXY: CPT

## 2025-08-21 PROCEDURE — 86039 ANTINUCLEAR ANTIBODIES (ANA): CPT

## 2025-08-21 PROCEDURE — 86038 ANTINUCLEAR ANTIBODIES: CPT

## 2025-08-21 PROCEDURE — 86617 LYME DISEASE ANTIBODY: CPT

## 2025-08-21 PROCEDURE — 86316 IMMUNOASSAY TUMOR OTHER: CPT

## 2025-08-21 PROCEDURE — 82728 ASSAY OF FERRITIN: CPT

## 2025-08-21 PROCEDURE — 83520 IMMUNOASSAY QUANT NOS NONAB: CPT

## 2025-08-21 PROCEDURE — 82607 VITAMIN B-12: CPT

## 2025-08-22 LAB — ANA SER QL IA: NEGATIVE

## 2025-08-24 LAB
APO B100 SERPL-MCNC: 71 MG/DL (ref 60–117)
CHROMOGRANIN A: 46 NG/ML (ref 0–187)
LPA SERPL-MCNC: 8 MG/DL
TRYPTASE SERPL-MCNC: 8.5 UG/L

## 2025-08-25 LAB
B BURGDOR IGG SER QL IB: NEGATIVE
B BURGDOR IGM SER QL IB: NEGATIVE

## 2025-08-26 LAB — HISTAMINE SERPL-SCNC: <8 NMOL/L (ref 0–8)

## 2025-08-30 LAB
SEND OUT REPORT: NORMAL
TEST NAME: NORMAL

## 2026-01-07 ENCOUNTER — APPOINTMENT (OUTPATIENT)
Dept: INTEGRATIVE MEDICINE | Facility: CLINIC | Age: 66
End: 2026-01-07
Payer: COMMERCIAL